# Patient Record
Sex: MALE | Race: WHITE | URBAN - METROPOLITAN AREA
[De-identification: names, ages, dates, MRNs, and addresses within clinical notes are randomized per-mention and may not be internally consistent; named-entity substitution may affect disease eponyms.]

---

## 2021-10-05 ENCOUNTER — TRANSCRIBE ORDERS (OUTPATIENT)
Dept: RESPIRATORY THERAPY | Facility: CLINIC | Age: 66
End: 2021-10-05

## 2021-10-05 DIAGNOSIS — R06.02 SOB (SHORTNESS OF BREATH): Primary | ICD-10-CM

## 2021-11-03 ENCOUNTER — HOSPITAL ENCOUNTER (OUTPATIENT)
Dept: RADIOLOGY | Facility: CLINIC | Age: 66
End: 2021-11-03
Attending: PHYSICAL MEDICINE & REHABILITATION
Payer: OTHER GOVERNMENT

## 2021-11-03 ENCOUNTER — HOSPITAL ENCOUNTER (OUTPATIENT)
Dept: RESPIRATORY THERAPY | Facility: CLINIC | Age: 66
End: 2021-11-03
Attending: PHYSICAL MEDICINE & REHABILITATION
Payer: OTHER GOVERNMENT

## 2021-11-03 DIAGNOSIS — R06.02 SOB (SHORTNESS OF BREATH): ICD-10-CM

## 2021-11-03 DIAGNOSIS — R06.02 SHORTNESS OF BREATH: ICD-10-CM

## 2021-11-03 LAB
EXPTIME-PRE: 7.11 SEC
FEF2575-%PRED-POST: 132 %
FEF2575-%PRED-PRE: 81 %
FEF2575-POST: 3.47 L/SEC
FEF2575-PRE: 2.13 L/SEC
FEF2575-PRED: 2.61 L/SEC
FEFMAX-%PRED-PRE: 85 %
FEFMAX-PRE: 7.24 L/SEC
FEFMAX-PRED: 8.49 L/SEC
FEV1-%PRED-PRE: 95 %
FEV1-PRE: 3.1 L
FEV1FEV6-PRE: 74 %
FEV1FEV6-PRED: 78 %
FEV1FVC-PRE: 74 %
FEV1FVC-PRED: 77 %
FIFMAX-PRE: 2.95 L/SEC
FVC-%PRED-PRE: 99 %
FVC-PRE: 4.21 L
FVC-PRED: 4.22 L

## 2021-11-03 PROCEDURE — 999N000157 HC STATISTIC RCP TIME EA 10 MIN

## 2021-11-03 PROCEDURE — 71046 X-RAY EXAM CHEST 2 VIEWS: CPT

## 2021-11-03 PROCEDURE — 94060 EVALUATION OF WHEEZING: CPT

## 2021-11-03 PROCEDURE — 94640 AIRWAY INHALATION TREATMENT: CPT

## 2021-11-03 PROCEDURE — 94060 EVALUATION OF WHEEZING: CPT | Mod: 26 | Performed by: INTERNAL MEDICINE

## 2021-11-03 RX ORDER — ALBUTEROL SULFATE 0.83 MG/ML
2.5 SOLUTION RESPIRATORY (INHALATION) ONCE
Status: COMPLETED | OUTPATIENT
Start: 2021-11-03 | End: 2021-11-03

## 2021-11-03 RX ADMIN — ALBUTEROL SULFATE 2.5 MG: 0.83 SOLUTION RESPIRATORY (INHALATION) at 12:17

## 2021-11-03 NOTE — PROGRESS NOTES
PFT NOTE  Pt gave good effort & was cooperative, was able to meet ATS standards for acceptability and repeatability. albuterol was given for the bronchodilator. Patient left in no distress.    Kathia Rosales, RT